# Patient Record
Sex: MALE | Race: BLACK OR AFRICAN AMERICAN | URBAN - METROPOLITAN AREA
[De-identification: names, ages, dates, MRNs, and addresses within clinical notes are randomized per-mention and may not be internally consistent; named-entity substitution may affect disease eponyms.]

---

## 2020-09-09 ENCOUNTER — HISTORICAL (OUTPATIENT)
Dept: SURGERY | Facility: HOSPITAL | Age: 45
End: 2020-09-09

## 2022-04-11 ENCOUNTER — HISTORICAL (OUTPATIENT)
Dept: ADMINISTRATIVE | Facility: HOSPITAL | Age: 47
End: 2022-04-11

## 2022-04-27 VITALS
DIASTOLIC BLOOD PRESSURE: 100 MMHG | BODY MASS INDEX: 36.51 KG/M2 | SYSTOLIC BLOOD PRESSURE: 144 MMHG | HEIGHT: 71 IN | WEIGHT: 260.81 LBS

## 2022-05-05 NOTE — HISTORICAL [facility] CERNER
This is a historical note converted from David. Formatting and pictures may have been removed.  Please reference David for original formatting and attached multimedia. OPERATIVE REPORT  ?  DATE: 9/9/2020  ?  ASSISTANT: Marbella Landry, surgical technician  ?  PREOPERATIVE DIAGNOSIS:  1. ?Right?Achilles tendon rupture,?mid substance  ?  POSTOPERATIVE DIAGNOSIS:  Same  ?  PROCEDURES:  1. ?Right Achilles tendon repair  ?  ANESTHESIA:  General?  ?  BLOOD LOSS:  None  ?  DVT PROPHYLAXIS:  Aspirin twice daily for 2 weeks  ?  INSTRUMENTATION:  Arthrex?PARS?Achilles repair system  ?  PROCEDURE IN DETAIL:  ?  Achilles tendon repair (PARS)  ?  The patient was brought to room and placed on the table in a prone position. ?The operative lower extremity was prepped and draped in the normal sterile fashion. ?A timeout procedure was done to confirm the operative extremity, the procedure, allergies, and if antibodies were given.  ?  Next we started with a small medial longitudinal incision along the Achilles defect. ?I then did dissection through the peritenon and then could visualize the Achilles tendon rupture. ?I removed all the scar and clot material. ?I then cleaned of both ends of the tendon. ?Next I passed the PARS suture shuttling device to the proximal portion of the tendon. ?I then passed the sutures through the tendons from medial to lateral in the appropriate fashion. ?I then locked the blue suture on both sides. ?I then performed the same maneuver on the distal tendon stump. ?I then tied the same color sutures on both the medial lateral sides of the Achilles tendon. ?There was good appropriation of the tendon and a fairly rigid plantar flexed position of the foot.  ?  I then irrigated the wound very thoroughly. ?I closed the subcutaneous tissue with 2-0 Vicryls. ?I then closed the skin with a 4-0 running nylon stitch. ?I then placed a sterile dressing and placed the patient in a cast with the foot in plantar flexion.  ?Patient tolerated procedure well without any complications.